# Patient Record
(demographics unavailable — no encounter records)

---

## 2025-03-03 NOTE — HISTORY OF PRESENT ILLNESS
[FreeTextEntry1] : He is a 75 year-old man who is seen today for elevated PSA level.  Nocturia is once.  Residual urine today was again minimal.  PSA September 2024 was 4.2 with 15% free PSA.  MRI of the prostate in October 2024 showed a 29 g prostate and a stable PI-RADS 4 lesion.  Therefore he continued to monitor and repeat PSA level today.  There is no bone pain or weight loss.  PSA level in September 2023 was 3.07 and in March 2024 was 3.34.  MRI of the prostate in January 2023 showed a 32 g prostate and a right-sided PI-RADS 4 lesion.  He underwent transperineal fusion prostate biopsy in March 2023 which was benign.   There is no known family history of prostate cancer. 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

## 2025-03-03 NOTE — ASSESSMENT
[FreeTextEntry1] : His exam is unchanged.  He empties bladder well.  Nocturia is minimal.  PSA level history and prostate biopsy and MRI were discussed.  False-negative rate of biopsy discussed.  PSA level will be checked and then pending results, will decide on repeat transperineal fusion prostate biopsy.  Otherwise, he will follow-up in 6 months.  Kam Springer MD, FACS [facility]  of Urology [address], [address] [place] [address] Tel: [phone] Fax: [phone]

## 2025-03-03 NOTE — LETTER BODY
[Dear  ___] : Dear  [unfilled], [Consult Letter:] : I had the pleasure of evaluating your patient, [unfilled]. [Consult Closing:] : Thank you very much for allowing me to participate in the care of this patient.  If you have any questions, please do not hesitate to contact me. [FreeTextEntry1] : [facility]\par  [address] \[place]  [place], [place], [address]  \par  [phone]  PROVIDER:[TOKEN:[[id number]]] PROVIDER:[TOKEN:[[id number]]],PROVIDER:[TOKEN:[[id number]],FOLLOWUP:[2 weeks]]

## 2025-03-03 NOTE — PHYSICAL EXAM
[Urethral Meatus] : meatus normal [Penis Abnormality] : normal circumcised penis [Urinary Bladder Findings] : the bladder was normal on palpation [Scrotum] : the scrotum was normal [Testes Mass (___cm)] : there were no testicular masses [Prostate Tenderness] : the prostate was not tender [No Prostate Nodules] : no prostate nodules [Prostate Enlarged] : was enlarged [FreeTextEntry1] : The right testis absent.  Exam done in the past [General Appearance - Well Developed] : well developed [General Appearance - Well Nourished] : well nourished [Normal Appearance] : normal appearance [Well Groomed] : well groomed [] : no respiratory distress [Abdomen Soft] : soft [Abdomen Tenderness] : non-tender [Normal Station and Gait] : the gait and station were normal for the patient's age [Oriented To Time, Place, And Person] : oriented to person, place, and time [Affect] : the affect was normal [Mood] : the mood was normal [Not Anxious] : not anxious